# Patient Record
Sex: FEMALE | Race: WHITE | NOT HISPANIC OR LATINO | URBAN - METROPOLITAN AREA
[De-identification: names, ages, dates, MRNs, and addresses within clinical notes are randomized per-mention and may not be internally consistent; named-entity substitution may affect disease eponyms.]

---

## 2018-01-08 ENCOUNTER — EMERGENCY (EMERGENCY)
Facility: HOSPITAL | Age: 28
LOS: 0 days | Discharge: HOME | End: 2018-01-08
Admitting: INTERNAL MEDICINE

## 2018-01-08 DIAGNOSIS — L29.8 OTHER PRURITUS: ICD-10-CM

## 2018-01-08 DIAGNOSIS — R21 RASH AND OTHER NONSPECIFIC SKIN ERUPTION: ICD-10-CM

## 2019-01-14 ENCOUNTER — OUTPATIENT (OUTPATIENT)
Dept: OUTPATIENT SERVICES | Facility: HOSPITAL | Age: 29
LOS: 1 days | Discharge: HOME | End: 2019-01-14

## 2019-01-14 VITALS
WEIGHT: 130.07 LBS | RESPIRATION RATE: 18 BRPM | OXYGEN SATURATION: 100 % | HEIGHT: 65 IN | TEMPERATURE: 97 F | SYSTOLIC BLOOD PRESSURE: 130 MMHG | HEART RATE: 85 BPM | DIASTOLIC BLOOD PRESSURE: 80 MMHG

## 2019-01-14 DIAGNOSIS — M20.42 OTHER HAMMER TOE(S) (ACQUIRED), LEFT FOOT: ICD-10-CM

## 2019-01-14 DIAGNOSIS — Z01.818 ENCOUNTER FOR OTHER PREPROCEDURAL EXAMINATION: ICD-10-CM

## 2019-01-14 DIAGNOSIS — M20.12 HALLUX VALGUS (ACQUIRED), LEFT FOOT: ICD-10-CM

## 2019-01-14 LAB
ANION GAP SERPL CALC-SCNC: 17 MMOL/L — HIGH (ref 7–14)
APPEARANCE UR: ABNORMAL
BACTERIA # UR AUTO: ABNORMAL /HPF
BASOPHILS # BLD AUTO: 0.02 K/UL — SIGNIFICANT CHANGE UP (ref 0–0.2)
BASOPHILS NFR BLD AUTO: 0.3 % — SIGNIFICANT CHANGE UP (ref 0–1)
BILIRUB UR-MCNC: NEGATIVE — SIGNIFICANT CHANGE UP
BUN SERPL-MCNC: 8 MG/DL — LOW (ref 10–20)
CALCIUM SERPL-MCNC: 8.9 MG/DL — SIGNIFICANT CHANGE UP (ref 8.5–10.1)
CHLORIDE SERPL-SCNC: 98 MMOL/L — SIGNIFICANT CHANGE UP (ref 98–110)
CO2 SERPL-SCNC: 24 MMOL/L — SIGNIFICANT CHANGE UP (ref 17–32)
COLOR SPEC: YELLOW — SIGNIFICANT CHANGE UP
CREAT SERPL-MCNC: 0.7 MG/DL — SIGNIFICANT CHANGE UP (ref 0.7–1.5)
DIFF PNL FLD: NEGATIVE — SIGNIFICANT CHANGE UP
EOSINOPHIL # BLD AUTO: 0.03 K/UL — SIGNIFICANT CHANGE UP (ref 0–0.7)
EOSINOPHIL NFR BLD AUTO: 0.5 % — SIGNIFICANT CHANGE UP (ref 0–8)
EPI CELLS # UR: ABNORMAL /HPF
GLUCOSE SERPL-MCNC: 73 MG/DL — SIGNIFICANT CHANGE UP (ref 70–99)
GLUCOSE UR QL: NEGATIVE MG/DL — SIGNIFICANT CHANGE UP
HCT VFR BLD CALC: 37.4 % — SIGNIFICANT CHANGE UP (ref 37–47)
HGB BLD-MCNC: 12.4 G/DL — SIGNIFICANT CHANGE UP (ref 12–16)
IMM GRANULOCYTES NFR BLD AUTO: 0.3 % — SIGNIFICANT CHANGE UP (ref 0.1–0.3)
KETONES UR-MCNC: NEGATIVE — SIGNIFICANT CHANGE UP
LEUKOCYTE ESTERASE UR-ACNC: NEGATIVE — SIGNIFICANT CHANGE UP
LYMPHOCYTES # BLD AUTO: 2.61 K/UL — SIGNIFICANT CHANGE UP (ref 1.2–3.4)
LYMPHOCYTES # BLD AUTO: 42.3 % — SIGNIFICANT CHANGE UP (ref 20.5–51.1)
MCHC RBC-ENTMCNC: 29.5 PG — SIGNIFICANT CHANGE UP (ref 27–31)
MCHC RBC-ENTMCNC: 33.2 G/DL — SIGNIFICANT CHANGE UP (ref 32–37)
MCV RBC AUTO: 89 FL — SIGNIFICANT CHANGE UP (ref 81–99)
MONOCYTES # BLD AUTO: 0.46 K/UL — SIGNIFICANT CHANGE UP (ref 0.1–0.6)
MONOCYTES NFR BLD AUTO: 7.5 % — SIGNIFICANT CHANGE UP (ref 1.7–9.3)
NEUTROPHILS # BLD AUTO: 3.03 K/UL — SIGNIFICANT CHANGE UP (ref 1.4–6.5)
NEUTROPHILS NFR BLD AUTO: 49.1 % — SIGNIFICANT CHANGE UP (ref 42.2–75.2)
NITRITE UR-MCNC: NEGATIVE — SIGNIFICANT CHANGE UP
NRBC # BLD: 0 /100 WBCS — SIGNIFICANT CHANGE UP (ref 0–0)
PH UR: 6 — SIGNIFICANT CHANGE UP (ref 5–8)
PLATELET # BLD AUTO: 327 K/UL — SIGNIFICANT CHANGE UP (ref 130–400)
POTASSIUM SERPL-MCNC: 3.8 MMOL/L — SIGNIFICANT CHANGE UP (ref 3.5–5)
POTASSIUM SERPL-SCNC: 3.8 MMOL/L — SIGNIFICANT CHANGE UP (ref 3.5–5)
PROT UR-MCNC: NEGATIVE MG/DL — SIGNIFICANT CHANGE UP
RBC # BLD: 4.2 M/UL — SIGNIFICANT CHANGE UP (ref 4.2–5.4)
RBC # FLD: 12.1 % — SIGNIFICANT CHANGE UP (ref 11.5–14.5)
SODIUM SERPL-SCNC: 139 MMOL/L — SIGNIFICANT CHANGE UP (ref 135–146)
SP GR SPEC: 1.02 — SIGNIFICANT CHANGE UP (ref 1.01–1.03)
UROBILINOGEN FLD QL: 0.2 MG/DL — SIGNIFICANT CHANGE UP (ref 0.2–0.2)
WBC # BLD: 6.17 K/UL — SIGNIFICANT CHANGE UP (ref 4.8–10.8)
WBC # FLD AUTO: 6.17 K/UL — SIGNIFICANT CHANGE UP (ref 4.8–10.8)
WBC UR QL: SIGNIFICANT CHANGE UP /HPF

## 2019-01-14 NOTE — H&P PST ADULT - HISTORY OF PRESENT ILLNESS
PATIENT CURRENTLY DENIES CHEST PAIN  SHORTNESS OF BREATH  PALPITATIONS,  DYSURIA.   UPPER RESPIRATORY INFECTION IN PAST 2 WEEKS- resolving finished abx  EXERCISE  TOLERANCE  1-2 FLIGHT OF STAIRS  WITHOUT SHORTNESS OF BREATH

## 2019-01-28 ENCOUNTER — OUTPATIENT (OUTPATIENT)
Dept: OUTPATIENT SERVICES | Facility: HOSPITAL | Age: 29
LOS: 1 days | Discharge: HOME | End: 2019-01-28

## 2019-01-28 VITALS
SYSTOLIC BLOOD PRESSURE: 145 MMHG | OXYGEN SATURATION: 100 % | TEMPERATURE: 98 F | RESPIRATION RATE: 18 BRPM | HEART RATE: 143 BPM | WEIGHT: 130.07 LBS | HEIGHT: 65 IN | DIASTOLIC BLOOD PRESSURE: 86 MMHG

## 2019-01-28 VITALS
OXYGEN SATURATION: 100 % | SYSTOLIC BLOOD PRESSURE: 147 MMHG | DIASTOLIC BLOOD PRESSURE: 95 MMHG | HEART RATE: 96 BPM | RESPIRATION RATE: 18 BRPM

## 2019-01-28 DIAGNOSIS — M21.612 BUNION OF LEFT FOOT: ICD-10-CM

## 2019-01-28 DIAGNOSIS — M20.42 OTHER HAMMER TOE(S) (ACQUIRED), LEFT FOOT: ICD-10-CM

## 2019-01-28 RX ORDER — AMOXICILLIN 250 MG/5ML
0 SUSPENSION, RECONSTITUTED, ORAL (ML) ORAL
Qty: 0 | Refills: 0 | COMMUNITY

## 2019-01-28 RX ORDER — NORGESTIMATE AND ETHINYL ESTRADIOL 7DAYSX3 LO
1 KIT ORAL
Qty: 0 | Refills: 0 | COMMUNITY

## 2019-01-28 RX ORDER — ACETAMINOPHEN 500 MG
650 TABLET ORAL ONCE
Qty: 0 | Refills: 0 | Status: DISCONTINUED | OUTPATIENT
Start: 2019-01-28 | End: 2019-02-12

## 2019-01-28 RX ORDER — ONDANSETRON 8 MG/1
4 TABLET, FILM COATED ORAL ONCE
Qty: 0 | Refills: 0 | Status: DISCONTINUED | OUTPATIENT
Start: 2019-01-28 | End: 2019-02-12

## 2019-01-28 RX ORDER — ALPRAZOLAM 0.25 MG
0 TABLET ORAL
Qty: 0 | Refills: 0 | COMMUNITY

## 2019-01-28 RX ORDER — HYDROMORPHONE HYDROCHLORIDE 2 MG/ML
0.5 INJECTION INTRAMUSCULAR; INTRAVENOUS; SUBCUTANEOUS
Qty: 0 | Refills: 0 | Status: DISCONTINUED | OUTPATIENT
Start: 2019-01-28 | End: 2019-01-28

## 2019-01-28 RX ORDER — OXYCODONE AND ACETAMINOPHEN 5; 325 MG/1; MG/1
1 TABLET ORAL ONCE
Qty: 0 | Refills: 0 | Status: DISCONTINUED | OUTPATIENT
Start: 2019-01-28 | End: 2019-01-28

## 2019-01-28 RX ORDER — SODIUM CHLORIDE 9 MG/ML
1000 INJECTION, SOLUTION INTRAVENOUS
Qty: 0 | Refills: 0 | Status: DISCONTINUED | OUTPATIENT
Start: 2019-01-28 | End: 2019-02-12

## 2019-01-28 RX ORDER — DESONIDE OINTMENT, 0.05% 0.5 MG/G
1 OINTMENT TOPICAL
Qty: 0 | Refills: 0 | COMMUNITY

## 2019-01-28 RX ADMIN — SODIUM CHLORIDE 100 MILLILITER(S): 9 INJECTION, SOLUTION INTRAVENOUS at 11:00

## 2019-01-28 NOTE — ASU DISCHARGE PLAN (ADULT/PEDIATRIC). - ACTIVITY LEVEL
no weight bearing/weight bearing as tolerated/no exercise/no heavy lifting/no sports/gym/elevate extremity/no tub baths

## 2019-01-28 NOTE — PRE-ANESTHESIA EVALUATION ADULT - NSANTHOSAYNRD_GEN_A_CORE
No. LESLIE screening performed.  STOP BANG Legend: 0-2 = LOW Risk; 3-4 = INTERMEDIATE Risk; 5-8 = HIGH Risk/see leslie sheet

## 2019-01-28 NOTE — ASU PREOP CHECKLIST - HEIGHT IN INCHES
normal... Well appearing, well nourished, awake, alert, oriented to person, place, time/situation and in no apparent distress. 5

## 2019-01-28 NOTE — ASU DISCHARGE PLAN (ADULT/PEDIATRIC). - NOTIFY
Increased Irritability or Sluggishness/Inability to Tolerate Liquids or Foods/Bleeding that does not stop/Numbness, color, or temperature change to extremity/Unable to Urinate/Fever greater than 101/Excessive Diarrhea/Numbness, tingling/Pain not relieved by Medications/Swelling that continues/Persistent Nausea and Vomiting

## 2019-01-28 NOTE — BRIEF OPERATIVE NOTE - PROCEDURE
<<-----Click on this checkbox to enter Procedure Cricket operation, one toe  01/28/2019  left fifth toe  Active  BARBARA  Bunion surgery  01/28/2019  Left bunion  Active  BARBARA

## 2019-01-28 NOTE — PROGRESS NOTE ADULT - SUBJECTIVE AND OBJECTIVE BOX
Podiatry Pre-Operative Note   [id number] BRITNEY PELAYO is a 28y year old Female patient presenting to the operating room for surgical management of the Left foot. The patient has failed all conservative measures and requires surgical intervention at this time.    PAST MEDICAL & SURGICAL HISTORY:  No pertinent past medical history  No significant past surgical history    Medications:   amoxicillin 125 mg oral tablet, chewable:   Desonate 0.05% topical gel: Apply topically to affected area 2 times a day  Tri Femynor oral tablet: 1 tab(s) orally once a day  Xanax 0.5 mg oral tablet:     Allergies  No Known Allergies    Consent [Done]  H&P [Done]  Medical Clearance [PAST]    T(C): 36.7 (01-28-19 @ 08:53), Max: 36.7 (01-28-19 @ 08:07)  HR: 143 (01-28-19 @ 08:53) (143 - 143)  BP: 145/86 (01-28-19 @ 08:53) (145/86 - 145/86)  RR: 18 (01-28-19 @ 08:53) (18 - 18)  SpO2: 100% (01-28-19 @ 08:53) (100% - 100%)    Surgeon: Dr. Michael Banda DPM  Assistant (s): Dr. Xavi Orozco DPSTARR  Pre Operative Diagnosis: Left fifth toe hammertoe and Left first metatarsal bunion  Planned Procedure: Arthroplasty fifth Left foot and osteotomy Left first metatarsal     The patient has been educated on the above procedure, with all risks and benefits described. No guarantees were given or implied for the aforementioned procedure.  The above assistant(s) have introduced themselves to the patient. The patient consents to their participation in the procedure listed above.   01-28-19 @ 09:23 Podiatry Pre-Operative Note   [id number] BRITNEY PELAYO is a 28y year old Female patient presenting to the operating room for surgical management of the Left foot. The patient has failed all conservative measures and requires surgical intervention at this time.    PAST MEDICAL & SURGICAL HISTORY:  No pertinent past medical history  No significant past surgical history    Medications:   amoxicillin 125 mg oral tablet, chewable:   Desonate 0.05% topical gel: Apply topically to affected area 2 times a day  Tri Femynor oral tablet: 1 tab(s) orally once a day  Xanax 0.5 mg oral tablet:     Allergies  No Known Allergies    Consent [Done]  H&P [Done]  Medical Clearance [PAST]    T(C): 36.7 (01-28-19 @ 08:53), Max: 36.7 (01-28-19 @ 08:07)  HR: 143 (01-28-19 @ 08:53) (143 - 143)  BP: 145/86 (01-28-19 @ 08:53) (145/86 - 145/86)  RR: 18 (01-28-19 @ 08:53) (18 - 18)  SpO2: 100% (01-28-19 @ 08:53) (100% - 100%)    Surgeon: Dr. Michael Banda DPM  Assistant (s): Dr. Xavi Orozco DPSTARR  Pre Operative Diagnosis: Left fifth toe hammertoe and Left first metatarsal bunion  Planned Procedure: Arthroplasty fifth Left foot and osteotomy Left first metatarsal     The patient has been educated on the above procedure, with all risks and benefits described. No guarantees were given or implied for the aforementioned procedure.  The above assistant(s) have introduced themselves to the patient. The patient consents to their participation in the procedure listed above.   01-28-19 @ 09:23    BIOMECHANICAL EXAMINATION  ABP Suggested Biomechanical Examination Form     *Non-weight bearing assessment:  Internal Hip rotation: Left 45 & Right 45  External Hip rotation: Left 45 & Right 45   Neutral Position of Hip: Left 0 & Right 0  Malleolar position: Left 16 & Right 16  Ankle Dorsiflexion (knee extended): Left 10 & Right 10  Ankle Dorsiflexion (knee flexed): Left >10 & Right >10  Heel inversion: Left 20 & Right 20  Heel eversion: Left 10 & Right 10  STJ Neutral Position: Left 0 & Right 0  Forefoot to Rearfoot (1-5): Left perpendicular & Right perpendicular  Forefoot to rearfoot (2-5): Left perpendicular & Right perpendicular  First Ray dorsiflexion: Left 5mm & Right 5mm  First Ray plantarflexion: Left 5mm & Right 5mm  First Ray Neurtal position: Left 0mm & Right 0mm  Hallux dorsiflexion (not loaded): Left 65 & Right 65  Hallux dorsiflexion (loaded): Left 65 & Right 65  Hallux plantar flexion: Left >30 & Right >30    *Foot Morphology   Frontal plane  Normal morphology: Left & Right    *Ankle Morphology  Normal morphology: Right & Left      *Postural Appraisal   Head position: forward   Shoulders:[Level   Pelvis: level     *Patella Orientation: Left & Right  Central     *Knee Orientation: Left and Right  Valgum     *Tibia Orientation: Left and Right   Varum    *Malleolar position: Left and Right    External     *NCSP (Neutral Calcaneal Stance position in degrees)  Left 2 degrees   Right 2 degrees    *RCSP (Resting Calcaneal Stance Position in degrees)  Left 4 degrees   Right 4 degrees    *Quality of motion: Left &Right   Ankle (dorsiflexion): normal   Ankle (plantarflexion): normal    STJ (supination): normal    STJ (pronation): normal   Hallux (dorsiflexion): normal    Hallux (plantarflexion): normal    Lesser digits (dorsiflexion): normal   Lesser digits (plantarflexion): normal     *Saggittal Plane: Left and Right  Normal morphology    *Limb Length Inequality (in cm) Left &Right  Normal     *Gait Analysis (Barefoot gait pattern)  Normal   Angle of Gait: Left 12 / Right 12    Base of gait: Left 3.5 / Right 3.5     *Patellar Position: Left / Right   Contact    *Heel position:  Left / Right   Contact    *Heel off:  Left / Right   WNL    *Abductory Twist:  Left / Right   No    *Muscle Strength (0-5/5)    Hip flexors: Left [5/5] & Right [5/5]  Hip extensors:  Left [5/5] & Right [5/5]  Hip Abductors: Left [5/5] & Right [5/5]  Hip Adductors:  Left [5/5] & Right [5/5]  Hip rotators (internal):  Left [5/5] & Right [5/5]  Hip rotators (external):  Left [5/5] & Right [5/5]  Gastronemius:  Left [5/5] & Right [5/5]  Soleus:  Left [5/5] & Right [5/5]  Tibialis Posterior: Left [5/5] & Right [5/5]  Flexor hallucis longus:  Left [5/5] & Right [5/5]  Flexor digitorum longus:  Left [5/5] & Right [5/5]  Flexor digitorum brevis:  Left [5/5] & Right [5/5]  Tibialis anterior:  Left [5/5] & Right [5/5]  Extensor digitorum longus:  Left [5/5] & Right [5/5]  Extensor hallucis longus:  Left [5/5] & Right [5/5]  Extensor digitorum brevis:  Left [5/5] & Right [5/5]  Peroneus Longus:  Left [5/5] & Right [5/5]  Peroneus Brevis:  Left [5/5] & Right [5/5]    *Transverse Plane  Normal morphology: Left / Right     *Digital Assessment:  Abducted:  R: -   L: 1  Adducted:  R: -   L: -  Claw toe:  R: -   L: -  Hammertoe:  R: -   L: 5  Mallet toe:  R: -   L: -    If a portion of the exam is deferred please give a reason:    **Assessment:  1) Left hallux abducted position consistent with bunion   2) Left fifth toe hammering with adductovarus attitude     **Treatment Plan:  BIOMECHANICAL EXAM conducted.   Patient has under gone conservative treatment including but not limited to: shoe modification, NSAID, and injections.   Management plan: AP Left 5th toe and ostetomy of the Left 1st metatarsal   Attending updated and added to note for review.

## 2019-01-28 NOTE — BRIEF OPERATIVE NOTE - PRE-OP DX
Bunion of great toe of left foot  01/28/2019    Active  Xavi Orozco  Hammertoe of left foot  01/28/2019  fifth toe left foot  Active  Xavi Orozco

## 2019-01-28 NOTE — BRIEF OPERATIVE NOTE - POST-OP DX
Bunion of great toe of left foot  01/28/2019    Active  Xavi Orozco  Hammertoe of left foot  01/28/2019  fifth  Active  Xavi Orozco

## 2019-01-28 NOTE — ASU DISCHARGE PLAN (ADULT/PEDIATRIC). - ASU FOLLOWUP
[facility] [address]:  Endoscopy/Ambulatory Surgery [place]... [facility]:  [place] [facility]...

## 2019-01-28 NOTE — PROGRESS NOTE ADULT - REASON FOR ADMISSION
Left painful bunionette and left fifth toe hammer toe Left painful bunion and left fifth toe hammer toe

## 2019-01-28 NOTE — CHART NOTE - NSCHARTNOTEFT_GEN_A_CORE
PACU ANESTHESIA ADMISSION NOTE      Procedure: Bunion surgery: Left bunion  Hammertoe operation, one toe: left fifth toe    Post op diagnosis:  Hammertoe of left foot  Bunion of great toe of left foot      ____  Intubated  TV:______       Rate: ______      FiO2: ______    _x___  Patent Airway    _x___  Full return of protective reflexes    _x___  Full recovery from anesthesia / back to baseline status    Vitals:  T(C): 36.7  HR: 88  BP: 115/70  RR: 18  SpO2: 100%    Mental Status:  _x___ Awake   _____ Alert   _____ Drowsy   _____ Sedated    Nausea/Vomiting:  _x___  NO       ______Yes,   See Post - Op Orders         Pain Scale (0-10):  __0___    Treatment: _x___ None    ____ See Post - Op/PCA Orders    Post - Operative Fluids:   __x__ Oral   ____ See Post - Op Orders    Plan: Discharge:   _x___Home       _____Floor     _____Critical Care    _____  Other:_________________    Comments:  No anesthesia issues or complications noted.  Discharge when criteria met.

## 2019-01-31 DIAGNOSIS — M20.12 HALLUX VALGUS (ACQUIRED), LEFT FOOT: ICD-10-CM

## 2019-01-31 DIAGNOSIS — M20.42 OTHER HAMMER TOE(S) (ACQUIRED), LEFT FOOT: ICD-10-CM

## 2019-02-01 LAB — SURGICAL PATHOLOGY STUDY: SIGNIFICANT CHANGE UP

## 2023-04-29 ENCOUNTER — EMERGENCY (EMERGENCY)
Facility: HOSPITAL | Age: 33
LOS: 0 days | Discharge: HOME | End: 2023-04-29
Attending: STUDENT IN AN ORGANIZED HEALTH CARE EDUCATION/TRAINING PROGRAM
Payer: COMMERCIAL

## 2023-04-29 VITALS
TEMPERATURE: 98 F | HEART RATE: 124 BPM | DIASTOLIC BLOOD PRESSURE: 99 MMHG | OXYGEN SATURATION: 99 % | SYSTOLIC BLOOD PRESSURE: 144 MMHG | RESPIRATION RATE: 18 BRPM | WEIGHT: 145.06 LBS

## 2023-04-29 VITALS
HEART RATE: 98 BPM | SYSTOLIC BLOOD PRESSURE: 148 MMHG | RESPIRATION RATE: 18 BRPM | TEMPERATURE: 98 F | DIASTOLIC BLOOD PRESSURE: 94 MMHG | OXYGEN SATURATION: 100 %

## 2023-04-29 DIAGNOSIS — L53.9 ERYTHEMATOUS CONDITION, UNSPECIFIED: ICD-10-CM

## 2023-04-29 DIAGNOSIS — T78.40XA ALLERGY, UNSPECIFIED, INITIAL ENCOUNTER: ICD-10-CM
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
## 2023-04-29 DIAGNOSIS — L29.9 PRURITUS, UNSPECIFIED: ICD-10-CM
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
## 2023-04-29 DIAGNOSIS — R22.0 LOCALIZED SWELLING, MASS AND LUMP, HEAD: ICD-10-CM

## 2023-04-29 PROCEDURE — 96374 THER/PROPH/DIAG INJ IV PUSH: CPT

## 2023-04-29 PROCEDURE — 99284 EMERGENCY DEPT VISIT MOD MDM: CPT | Mod: 25

## 2023-04-29 PROCEDURE — 99284 EMERGENCY DEPT VISIT MOD MDM: CPT

## 2023-04-29 PROCEDURE — 96375 TX/PRO/DX INJ NEW DRUG ADDON: CPT

## 2023-04-29 RX ORDER — FAMOTIDINE 10 MG/ML
20 INJECTION INTRAVENOUS ONCE
Refills: 0 | Status: COMPLETED | OUTPATIENT
Start: 2023-04-29 | End: 2023-04-29

## 2023-04-29 RX ORDER — DEXAMETHASONE 0.5 MG/5ML
10 ELIXIR ORAL ONCE
Refills: 0 | Status: COMPLETED | OUTPATIENT
Start: 2023-04-29 | End: 2023-04-29

## 2023-04-29 RX ORDER — DIPHENHYDRAMINE HCL 50 MG
50 CAPSULE ORAL ONCE
Refills: 0 | Status: COMPLETED | OUTPATIENT
Start: 2023-04-29 | End: 2023-04-29

## 2023-04-29 RX ADMIN — Medication 50 MILLIGRAM(S): at 09:52

## 2023-04-29 RX ADMIN — FAMOTIDINE 20 MILLIGRAM(S): 10 INJECTION INTRAVENOUS at 09:50

## 2023-04-29 RX ADMIN — Medication 10 MILLIGRAM(S): at 09:51

## 2023-04-29 NOTE — ED PROVIDER NOTE - OBJECTIVE STATEMENT
Pt is a 32 y/o female with no pmhx presenting for symptoms of allergic reaction including itchiness, erythema to skin, and facial swelling. Pt states she has no known allergies. She went for MRI head 1 day ago at 11am with contrast. Pt agreed gadolinium was given. She had no initial reactions. Throughout the day, she noted increased feelings of pruritis until about 11pm when she developed facial and bodily erythematous rash without urticaria. She awoke this morning with facial swelling and presented to ED. She did not take any medications PTA. She denies any feelings of tongue/throat swelling, difficulty breathing, shortness of breath, or symptoms of fever, chills, cough, sore throat, N/V, abdominal pain, or CP.

## 2023-04-29 NOTE — ED PROVIDER NOTE - CARE PROVIDER_API CALL
[address] [address]  Phone: [phone]  Fax: [phone]  Established Patient  Follow Up Time: 4-6 Days

## 2023-04-29 NOTE — ED PROVIDER NOTE - CLINICAL SUMMARY MEDICAL DECISION MAKING FREE TEXT BOX
32-year-old female presenting today with allergic reaction.  Patient is hemodynamically stable and well-appearing on evaluation.  Patient received medications and had improvement in her symptomatology.  Patient is nontoxic in appearance.  Patient is no respiratory distress, no wheezing.  Patient had significant improvement on reevaluation and was discharged to follow-up with PMD.  Return precautions explained to patient.

## 2023-04-29 NOTE — ED ADULT TRIAGE NOTE - CHIEF COMPLAINT QUOTE
Patient stated she had CT with contrast yesterday and by 11 PM began having itching to body. When she woke up noted swelling to mouth and face, non medications taken

## 2023-04-29 NOTE — ED ADULT NURSE NOTE - OBJECTIVE STATEMENT
Pt presents for allergic reaction after having contrast dye yesterday for a brain scan. Pt with swelling to mouth and face with redness to chest. In no respiratory distress.

## 2023-04-29 NOTE — ED PROVIDER NOTE - PHYSICAL EXAMINATION
As Follows:  CONST: Well appearing in NAD  EYES: EOMI, Sclera and conjunctiva clear.   ENT: No nasal discharge. Oropharynx normal appearing, no throat swelling, erythema or exudates. Uvula midline, Airway intact. Swelling to the bilateral face including cheeks and lips.   CARD: Normal S1 S2; Normal rate and rhythm  RESP: Equal BS B/L, No wheezes, rhonchi or rales. No distress  GI: Soft, non-tender, non-distended. No urticaria or hives to abdomen.   SKIN: Warm, dry, erythematous rash without urticaria or hives. MMM.   NEURO: A&Ox3, No focal deficits. Strength and sensation intact.

## 2023-04-29 NOTE — ED PROVIDER NOTE - NS ED MD DISPO DISCHARGE CCDA
details… Patient/Caregiver provided printed discharge information. Affect and characteristics of appearance, verbalizations, behaviors are appropriate

## 2023-04-29 NOTE — ED PROVIDER NOTE - PROGRESS NOTE DETAILS
TD: PT clinically improved, redness to chest resolved, facial  swelling improved, pt continues to have no throat swelling/SOB, no nausea/vomiting, feeling well, requesting discharge.

## 2023-04-29 NOTE — ED PROVIDER NOTE - PATIENT PORTAL LINK FT
You can access the FollowMyHealth Patient Portal offered by [facility] by registering at the following website: [email or web address]. By joining [employer]’s FollowMyHealth portal, you will also be able to view your health information using other applications (apps) compatible with our system.

## 2024-02-06 NOTE — ED ADULT NURSE NOTE - FINAL NURSING ELECTRONIC SIGNATURE
Patient will need to at least have a follow up (if doing physicals elsewhere) once  a year with PCP in order to continue to send refills.     - if wanting to have a physical with  can schedule that for October, if having physicals with [facility] will need a follow up scheduled for a med check.     Thank You    29-Apr-2023 11:47